# Patient Record
Sex: FEMALE | Race: WHITE | NOT HISPANIC OR LATINO
[De-identification: names, ages, dates, MRNs, and addresses within clinical notes are randomized per-mention and may not be internally consistent; named-entity substitution may affect disease eponyms.]

---

## 2020-11-02 PROBLEM — Z00.00 ENCOUNTER FOR PREVENTIVE HEALTH EXAMINATION: Status: ACTIVE | Noted: 2020-11-02

## 2020-11-17 ENCOUNTER — APPOINTMENT (OUTPATIENT)
Dept: PLASTIC SURGERY | Facility: CLINIC | Age: 50
End: 2020-11-17
Payer: COMMERCIAL

## 2020-11-17 VITALS — BODY MASS INDEX: 26.82 KG/M2 | WEIGHT: 161 LBS | HEIGHT: 65 IN

## 2020-11-17 DIAGNOSIS — Z87.898 PERSONAL HISTORY OF OTHER SPECIFIED CONDITIONS: ICD-10-CM

## 2020-11-17 DIAGNOSIS — Z98.84 BARIATRIC SURGERY STATUS: ICD-10-CM

## 2020-11-17 DIAGNOSIS — Z86.69 PERSONAL HISTORY OF OTHER DISEASES OF THE NERVOUS SYSTEM AND SENSE ORGANS: ICD-10-CM

## 2020-11-17 DIAGNOSIS — Z78.9 OTHER SPECIFIED HEALTH STATUS: ICD-10-CM

## 2020-11-17 PROCEDURE — 99072 ADDL SUPL MATRL&STAF TM PHE: CPT

## 2020-11-17 PROCEDURE — 99203 OFFICE O/P NEW LOW 30 MIN: CPT

## 2020-11-17 RX ORDER — OMEPRAZOLE 20 MG/1
20 CAPSULE, DELAYED RELEASE ORAL
Refills: 0 | Status: ACTIVE | COMMUNITY

## 2020-11-17 NOTE — PHYSICAL EXAM
[de-identified] : well-developed female, NAD [de-identified] : NC/AT [de-identified] : PERRL [de-identified] : supple [de-identified] : unlabored breathing, good inspiratory effort  [de-identified] : VALENTINAR [de-identified] : abdominal pannus with skin laxity,  [de-identified] : FROM in all 4 extremities

## 2020-11-17 NOTE — HISTORY OF PRESENT ILLNESS
[FreeTextEntry1] : 51 yo F otherwise healthy with PMHx of morbid obesity, max weight of 240 lbs who is s/p sleeve gastrectomy in 2018 at [facility] currently 161 lbs stable for at least 1 year who presents for panniculectomy consultation. Patient is c/o hanging abdominal pannus, back pain, frequent rash in skin folds treated with OTC topicals. \par \par Occupation- paraprofessional \par Nonsmoker

## 2020-11-17 NOTE — ASSESSMENT
[FreeTextEntry1] : 51 yo M with h/o MWL s/p sleeve gastrectomy who is a good candidate for panniculectomy. \par \par aug 2018 gastric sleeve w blanca-fielding at [facility]--lost 70 lbs\par current weight 161lbs\par here for abominal appearance\par nonsmoker nondiabetic\par \par has post bariatric weight loss dermal changes\par \par photos taken\par \par Due to COVID 19, pre-visit patient instructions were explained to the patient and their symptoms were checked upon arrival.  \par Masks were used by the health care providers and staff and the examination room was cleaned after the patient visit was completed.\par \par Patient is a good candidate for a panniculectomy.  Regarding the procedure, we discussed the risks of poor wound healing, seroma formation, infection, bleeding, keloid/hypertrophic scarring, dissatisfaction with the outcome, need for readmission, venous thromboembolic complications with possible pulmonary embolism.  We discussed the need for postop use of an abdominal binder and the limited activity after surgery.\par All the patient's questions were answered and all risks were well understood by the patient.\par

## 2020-12-15 ENCOUNTER — APPOINTMENT (OUTPATIENT)
Dept: OBGYN | Facility: CLINIC | Age: 50
End: 2020-12-15
Payer: COMMERCIAL

## 2020-12-15 PROCEDURE — 81002 URINALYSIS NONAUTO W/O SCOPE: CPT

## 2020-12-15 PROCEDURE — 99072 ADDL SUPL MATRL&STAF TM PHE: CPT

## 2020-12-15 PROCEDURE — 99396 PREV VISIT EST AGE 40-64: CPT

## 2021-01-05 ENCOUNTER — NON-APPOINTMENT (OUTPATIENT)
Age: 51
End: 2021-01-05

## 2021-01-05 DIAGNOSIS — Z87.42 PERSONAL HISTORY OF OTHER DISEASES OF THE FEMALE GENITAL TRACT: ICD-10-CM

## 2021-01-05 DIAGNOSIS — K21.9 GASTRO-ESOPHAGEAL REFLUX DISEASE W/OUT ESOPHAGITIS: ICD-10-CM

## 2021-01-05 RX ORDER — OMEPRAZOLE 40 MG/1
CAPSULE, DELAYED RELEASE ORAL
Refills: 0 | Status: ACTIVE | COMMUNITY

## 2021-01-19 ENCOUNTER — APPOINTMENT (OUTPATIENT)
Dept: SURGERY | Facility: CLINIC | Age: 51
End: 2021-01-19
Payer: COMMERCIAL

## 2021-01-19 VITALS
DIASTOLIC BLOOD PRESSURE: 75 MMHG | OXYGEN SATURATION: 98 % | WEIGHT: 160 LBS | TEMPERATURE: 97.4 F | HEART RATE: 79 BPM | SYSTOLIC BLOOD PRESSURE: 115 MMHG | BODY MASS INDEX: 26.66 KG/M2 | HEIGHT: 65 IN

## 2021-01-19 DIAGNOSIS — N60.42 MAMMARY DUCT ECTASIA OF LEFT BREAST: ICD-10-CM

## 2021-01-19 DIAGNOSIS — N60.41 MAMMARY DUCT ECTASIA OF RIGHT BREAST: ICD-10-CM

## 2021-01-19 PROCEDURE — 99243 OFF/OP CNSLTJ NEW/EST LOW 30: CPT

## 2021-01-19 PROCEDURE — 99072 ADDL SUPL MATRL&STAF TM PHE: CPT

## 2021-01-19 RX ORDER — HALOBETASOL PROPIONATE 0.5 MG/G
0.05 OINTMENT TOPICAL
Qty: 50 | Refills: 0 | Status: ACTIVE | COMMUNITY
Start: 2020-12-23

## 2021-01-19 RX ORDER — NAFTIFINE HYDROCHLORIDE 20 MG/G
2 CREAM TOPICAL
Qty: 45 | Refills: 0 | Status: ACTIVE | COMMUNITY
Start: 2020-12-23

## 2021-01-19 NOTE — REASON FOR VISIT
[Initial Evaluation] : an initial evaluation [FreeTextEntry1] : pt come in today with dense breast. Pt denies any family history of family cancer

## 2021-01-19 NOTE — HISTORY OF PRESENT ILLNESS
[FreeTextEntry1] : Patient presents to the office with a history of abnormal mammogram she had done in December 2020.  She recently had repeat mammography and sonogram.  She is in the office to discuss her options and like to discuss the mammographic findings.

## 2021-01-19 NOTE — CONSULT LETTER
[Dear  ___] : Dear  [unfilled], [Consult Letter:] : I had the pleasure of evaluating your patient, [unfilled]. [Please see my note below.] : Please see my note below. [Consult Closing:] : Thank you very much for allowing me to participate in the care of this patient.  If you have any questions, please do not hesitate to contact me. [Sincerely,] : Sincerely, [DrRosa  ___] : Dr. SALDAÑA [FreeTextEntry3] : Allan Luther MD, FACS\par [facility]\[place] [address]\par

## 2021-01-19 NOTE — DATA REVIEWED
[FreeTextEntry1] : Patient had mammography done on December 24, 2020 which showed bilateral asymmetries for which additional imaging was required.  On January 6, 2021 patient had mammo callback and ultrasound of the breast which failed to show any dilated ducts and the abnormality was no longer seen.  On sonogram there were diffuse bilateral retroareolar duct ectasia with no evidence of solid mass or filling defect.  Annual routine mammography was recommended.

## 2021-01-19 NOTE — ASSESSMENT
[FreeTextEntry1] : After examination patient was explained about my examination findings.  No masses were palpable.  There was no tenderness.  Patient was explained about duct ectasia in general.  The mammography and sonogram report was explained to the patient in detail.  Follow-up annual mammography was explained also.  Patient was explained that if she had any further question she could call the office.  Follow-up as needed.

## 2021-01-19 NOTE — PAST MEDICAL HISTORY
[Menarche Age ____] : age at menarche was [unfilled] [Menopause Age____] : age at menopause was [unfilled] [Approximately ___] : the LMP was approximately [unfilled] [Normal Amount/Duration] : it was of a normal amount and duration [Regular Cycle Intervals] : have been regular [Total Preg ___] : G[unfilled] [Live Births ___] : P[unfilled]  [Full Term ___] : Full Term: [unfilled] [Living ___] : Living: [unfilled] [Age At Live Birth ___] : Age at live birth: [unfilled] [History of Hormone Replacement Treatment] : has no history of hormone replacement treatment [FreeTextEntry6] : none  [FreeTextEntry5] : none [FreeTextEntry7] : none [FreeTextEntry8] : yes 32 4-5 months, 35 5-6 months , 38 6 months

## 2021-01-19 NOTE — PHYSICAL EXAM
[Normocephalic] : normocephalic [Atraumatic] : atraumatic [Supple] : supple [No Supraclavicular Adenopathy] : no supraclavicular adenopathy [Examined in the supine and seated position] : examined in the supine and seated position [Symmetrical] : symmetrical [No dominant masses] : no dominant masses in right breast  [No dominant masses] : no dominant masses left breast [No Nipple Retraction] : no left nipple retraction [No Nipple Discharge] : no left nipple discharge [Breast Mass Right Breast ___cm] : no masses [Breast Mass Left Breast ___cm] : no masses [Breast Nipple Inversion] : nipples not inverted [Breast Nipple Retraction] : nipples not retracted [Breast Nipple Flattening] : nipples not flattened [Breast Nipple Fissures] : nipples not fissured [Breast Abnormal Lactation (Galactorrhea)] : no galactorrhea [Breast Abnormal Secretion Bloody Fluid] : no bloody discharge [Breast Abnormal Secretion Serous Fluid] : no serous discharge [Breast Abnormal Secretion Opalescent Fluid] : no milky discharge [No Axillary Lymphadenopathy] : no left axillary lymphadenopathy [No Rashes] : no rashes [No Ulceration] : no ulceration

## 2021-03-17 ENCOUNTER — OUTPATIENT (OUTPATIENT)
Dept: OUTPATIENT SERVICES | Facility: HOSPITAL | Age: 51
LOS: 1 days | Discharge: HOME | End: 2021-03-17
Payer: COMMERCIAL

## 2021-03-17 VITALS
TEMPERATURE: 98 F | WEIGHT: 163.14 LBS | OXYGEN SATURATION: 98 % | RESPIRATION RATE: 18 BRPM | DIASTOLIC BLOOD PRESSURE: 69 MMHG | SYSTOLIC BLOOD PRESSURE: 123 MMHG | HEART RATE: 89 BPM

## 2021-03-17 DIAGNOSIS — E66.09 OTHER OBESITY DUE TO EXCESS CALORIES: ICD-10-CM

## 2021-03-17 DIAGNOSIS — Z98.890 OTHER SPECIFIED POSTPROCEDURAL STATES: Chronic | ICD-10-CM

## 2021-03-17 DIAGNOSIS — Z01.818 ENCOUNTER FOR OTHER PREPROCEDURAL EXAMINATION: ICD-10-CM

## 2021-03-17 LAB
ALBUMIN SERPL ELPH-MCNC: 4.5 G/DL — SIGNIFICANT CHANGE UP (ref 3.5–5.2)
ALP SERPL-CCNC: 54 U/L — SIGNIFICANT CHANGE UP (ref 30–115)
ALT FLD-CCNC: 11 U/L — SIGNIFICANT CHANGE UP (ref 0–41)
ANION GAP SERPL CALC-SCNC: 11 MMOL/L — SIGNIFICANT CHANGE UP (ref 7–14)
APTT BLD: 30.7 SEC — SIGNIFICANT CHANGE UP (ref 27–39.2)
AST SERPL-CCNC: 16 U/L — SIGNIFICANT CHANGE UP (ref 0–41)
BASOPHILS # BLD AUTO: 0.05 K/UL — SIGNIFICANT CHANGE UP (ref 0–0.2)
BASOPHILS NFR BLD AUTO: 1.1 % — HIGH (ref 0–1)
BILIRUB SERPL-MCNC: 0.4 MG/DL — SIGNIFICANT CHANGE UP (ref 0.2–1.2)
BUN SERPL-MCNC: 14 MG/DL — SIGNIFICANT CHANGE UP (ref 10–20)
CALCIUM SERPL-MCNC: 9.3 MG/DL — SIGNIFICANT CHANGE UP (ref 8.5–10.1)
CHLORIDE SERPL-SCNC: 103 MMOL/L — SIGNIFICANT CHANGE UP (ref 98–110)
CO2 SERPL-SCNC: 26 MMOL/L — SIGNIFICANT CHANGE UP (ref 17–32)
CREAT SERPL-MCNC: 0.6 MG/DL — LOW (ref 0.7–1.5)
EOSINOPHIL # BLD AUTO: 0.2 K/UL — SIGNIFICANT CHANGE UP (ref 0–0.7)
EOSINOPHIL NFR BLD AUTO: 4.2 % — SIGNIFICANT CHANGE UP (ref 0–8)
GLUCOSE SERPL-MCNC: 73 MG/DL — SIGNIFICANT CHANGE UP (ref 70–99)
HCT VFR BLD CALC: 41.1 % — SIGNIFICANT CHANGE UP (ref 37–47)
HGB BLD-MCNC: 13.2 G/DL — SIGNIFICANT CHANGE UP (ref 12–16)
IMM GRANULOCYTES NFR BLD AUTO: 0.2 % — SIGNIFICANT CHANGE UP (ref 0.1–0.3)
INR BLD: 0.95 RATIO — SIGNIFICANT CHANGE UP (ref 0.65–1.3)
LYMPHOCYTES # BLD AUTO: 1.4 K/UL — SIGNIFICANT CHANGE UP (ref 1.2–3.4)
LYMPHOCYTES # BLD AUTO: 29.6 % — SIGNIFICANT CHANGE UP (ref 20.5–51.1)
MCHC RBC-ENTMCNC: 29.9 PG — SIGNIFICANT CHANGE UP (ref 27–31)
MCHC RBC-ENTMCNC: 32.1 G/DL — SIGNIFICANT CHANGE UP (ref 32–37)
MCV RBC AUTO: 93 FL — SIGNIFICANT CHANGE UP (ref 81–99)
MONOCYTES # BLD AUTO: 0.6 K/UL — SIGNIFICANT CHANGE UP (ref 0.1–0.6)
MONOCYTES NFR BLD AUTO: 12.7 % — HIGH (ref 1.7–9.3)
NEUTROPHILS # BLD AUTO: 2.47 K/UL — SIGNIFICANT CHANGE UP (ref 1.4–6.5)
NEUTROPHILS NFR BLD AUTO: 52.2 % — SIGNIFICANT CHANGE UP (ref 42.2–75.2)
NRBC # BLD: 0 /100 WBCS — SIGNIFICANT CHANGE UP (ref 0–0)
PLATELET # BLD AUTO: 216 K/UL — SIGNIFICANT CHANGE UP (ref 130–400)
POTASSIUM SERPL-MCNC: 4.4 MMOL/L — SIGNIFICANT CHANGE UP (ref 3.5–5)
POTASSIUM SERPL-SCNC: 4.4 MMOL/L — SIGNIFICANT CHANGE UP (ref 3.5–5)
PROT SERPL-MCNC: 6.9 G/DL — SIGNIFICANT CHANGE UP (ref 6–8)
PROTHROM AB SERPL-ACNC: 10.9 SEC — SIGNIFICANT CHANGE UP (ref 9.95–12.87)
RBC # BLD: 4.42 M/UL — SIGNIFICANT CHANGE UP (ref 4.2–5.4)
RBC # FLD: 12 % — SIGNIFICANT CHANGE UP (ref 11.5–14.5)
SODIUM SERPL-SCNC: 140 MMOL/L — SIGNIFICANT CHANGE UP (ref 135–146)
WBC # BLD: 4.73 K/UL — LOW (ref 4.8–10.8)
WBC # FLD AUTO: 4.73 K/UL — LOW (ref 4.8–10.8)

## 2021-03-17 PROCEDURE — 93010 ELECTROCARDIOGRAM REPORT: CPT

## 2021-03-17 NOTE — H&P PST ADULT - NSICDXPASTMEDICALHX_GEN_ALL_CORE_FT
PAST MEDICAL HISTORY:  GERD (gastroesophageal reflux disease)     Obesity, morbid s/p gastric sleeve

## 2021-03-17 NOTE — H&P PST ADULT - HISTORY OF PRESENT ILLNESS
The patient is a 51 year old female with a PMH of morbid obesity, s/p gastric sleeve surgery in 08/2018. The patient is a now proceeding for a panniculectomy with Dr. Acosta. Today, the patient presents to [facility] for preop evaluation in preparation for scheduled surgery/procedure. The patient denies chest pains, SOB, palpitations, cough or dysuria. Able to walk 1-2 FOS without ARBOLEDA, CP or palpitations.    Anesthesia Alert  NO--Difficult Airway  NO--History of neck surgery or radiation  NO--Limited ROM of neck  NO--History of Malignant hyperthermia  NO--No personal or family history of Pseudocholinesterase deficiency.  NO--Prior Anesthesia Complication  NO--Latex Allergy  NO--Loose teeth  NO--History of Rheumatoid Arthritis  NO--YESICA  NO--Other_____

## 2021-03-29 DIAGNOSIS — G89.18 OTHER ACUTE POSTPROCEDURAL PAIN: ICD-10-CM

## 2021-03-29 RX ORDER — GABAPENTIN 300 MG/1
300 CAPSULE ORAL
Qty: 6 | Refills: 0 | Status: ACTIVE | COMMUNITY
Start: 2021-03-29 | End: 1900-01-01

## 2021-03-29 RX ORDER — CELECOXIB 400 MG/1
400 CAPSULE ORAL
Qty: 6 | Refills: 0 | Status: ACTIVE | COMMUNITY
Start: 2021-03-29 | End: 1900-01-01

## 2021-04-04 ENCOUNTER — LABORATORY RESULT (OUTPATIENT)
Age: 51
End: 2021-04-04

## 2021-04-04 ENCOUNTER — OUTPATIENT (OUTPATIENT)
Dept: OUTPATIENT SERVICES | Facility: HOSPITAL | Age: 51
LOS: 1 days | Discharge: HOME | End: 2021-04-04

## 2021-04-04 DIAGNOSIS — Z98.890 OTHER SPECIFIED POSTPROCEDURAL STATES: Chronic | ICD-10-CM

## 2021-04-04 DIAGNOSIS — Z11.59 ENCOUNTER FOR SCREENING FOR OTHER VIRAL DISEASES: ICD-10-CM

## 2021-04-04 PROBLEM — E66.01 MORBID (SEVERE) OBESITY DUE TO EXCESS CALORIES: Chronic | Status: ACTIVE | Noted: 2021-03-17

## 2021-04-04 PROBLEM — K21.9 GASTRO-ESOPHAGEAL REFLUX DISEASE WITHOUT ESOPHAGITIS: Chronic | Status: ACTIVE | Noted: 2021-03-17

## 2021-04-07 ENCOUNTER — APPOINTMENT (OUTPATIENT)
Dept: PLASTIC SURGERY | Facility: AMBULATORY SURGERY CENTER | Age: 51
End: 2021-04-07
Payer: COMMERCIAL

## 2021-04-07 ENCOUNTER — RESULT REVIEW (OUTPATIENT)
Age: 51
End: 2021-04-07

## 2021-04-07 ENCOUNTER — OUTPATIENT (OUTPATIENT)
Dept: OUTPATIENT SERVICES | Facility: HOSPITAL | Age: 51
LOS: 1 days | Discharge: HOME | End: 2021-04-07
Payer: COMMERCIAL

## 2021-04-07 VITALS
SYSTOLIC BLOOD PRESSURE: 106 MMHG | OXYGEN SATURATION: 100 % | HEIGHT: 65.5 IN | RESPIRATION RATE: 17 BRPM | DIASTOLIC BLOOD PRESSURE: 72 MMHG | WEIGHT: 162.04 LBS | TEMPERATURE: 98 F | HEART RATE: 66 BPM

## 2021-04-07 VITALS — RESPIRATION RATE: 18 BRPM | HEART RATE: 67 BPM | OXYGEN SATURATION: 97 %

## 2021-04-07 DIAGNOSIS — Z98.890 OTHER SPECIFIED POSTPROCEDURAL STATES: Chronic | ICD-10-CM

## 2021-04-07 PROCEDURE — 15830 EXC EXCESSIVE SKIN ABDOMEN: CPT

## 2021-04-07 PROCEDURE — 88302 TISSUE EXAM BY PATHOLOGIST: CPT | Mod: 26

## 2021-04-07 RX ORDER — SODIUM CHLORIDE 9 MG/ML
1000 INJECTION, SOLUTION INTRAVENOUS
Refills: 0 | Status: DISCONTINUED | OUTPATIENT
Start: 2021-04-07 | End: 2021-04-21

## 2021-04-07 RX ORDER — OMEPRAZOLE 10 MG/1
1 CAPSULE, DELAYED RELEASE ORAL
Qty: 0 | Refills: 0 | DISCHARGE

## 2021-04-07 RX ORDER — OXYCODONE AND ACETAMINOPHEN 5; 325 MG/1; MG/1
1 TABLET ORAL EVERY 4 HOURS
Refills: 0 | Status: DISCONTINUED | OUTPATIENT
Start: 2021-04-07 | End: 2021-04-07

## 2021-04-07 RX ORDER — HYDROMORPHONE HYDROCHLORIDE 2 MG/ML
0.5 INJECTION INTRAMUSCULAR; INTRAVENOUS; SUBCUTANEOUS
Refills: 0 | Status: DISCONTINUED | OUTPATIENT
Start: 2021-04-07 | End: 2021-04-07

## 2021-04-07 RX ORDER — TRAMADOL HYDROCHLORIDE 50 MG/1
1 TABLET ORAL
Qty: 10 | Refills: 0
Start: 2021-04-07

## 2021-04-07 RX ORDER — CEPHALEXIN 500 MG
1 CAPSULE ORAL
Qty: 20 | Refills: 0
Start: 2021-04-07 | End: 2021-04-11

## 2021-04-07 RX ADMIN — HYDROMORPHONE HYDROCHLORIDE 0.5 MILLIGRAM(S): 2 INJECTION INTRAMUSCULAR; INTRAVENOUS; SUBCUTANEOUS at 11:40

## 2021-04-07 NOTE — ASU PREOP CHECKLIST - PATIENT'S PERSONAL PROPERTY REMOVED
Phone placed in Locker with all other belongings prior to patient going to OR Phone placed in Locker E with all other belongings prior to patient going to OR.

## 2021-04-07 NOTE — ASU DISCHARGE PLAN (ADULT/PEDIATRIC) - ASU DC SPECIAL INSTRUCTIONSFT
Diet: You may resume your usual diet. Avoid alcohol and excessive salt intake for two weeks following surgery. This will help to minimize swelling.    Medications: Continue Gabapentin and Celebrex twice daily until complete. Take Tylenol 650mg every 6 hours. If pain is still not well controlled, then also take Tramadol as needed. Call the office with any issues. Take all antibiotics as prescribed. Remember no NSAIDS (Aspirin, Advil, Aleve, Motrin) as they can increase bruising.    Activity: Start walking as soon as possible to increase circulation and prevent blood clots. You may take care of your personal needs as desired, however, no lifting or strenuous activity is allowed for 4 weeks following surgery. Driving is not permitted for at least two weeks. Avoid standing up completely straight. Rather, walk slightly bent over to avoid pulling at abdominal closure. When laying down, use pillows under your head and knees to create a "beach chair" position.    Wound care: Keep your dressing clean, dry, and intact until seen by MD/PA. Wear the abdominal binder which will be provided to you at all times. Do not smoke! It delays wound healing and increases the risk of complications.    Personal Hygiene: Do not get the operative area wet. You are allowed to sponge bathe. Do not remove the abdominal binder. No tub soaking.    Drains: Please monitor and record the drain output daily. Keep an organized list of the output from each drain, in cc or mL, not ounces, and bring this with you to your postoperative visit.    Sun Exposure: You may be in the sun one month following surgery. Avoid sunburn. Always use a sunscreen of at least SPF30.    Things to expect: The operative area may be bruised, swollen, and painful. You may also have temporary numbness which will improve over time.    In case of emergency: call the office any time day or night. Post-operative care will be provided in the office one week following surgery. If you do not already have an appointment, please call during regular office hours to schedule: [phone].     We wish you a pleasant recovery.

## 2021-04-09 LAB — SURGICAL PATHOLOGY STUDY: SIGNIFICANT CHANGE UP

## 2021-04-14 ENCOUNTER — APPOINTMENT (OUTPATIENT)
Dept: PLASTIC SURGERY | Facility: CLINIC | Age: 51
End: 2021-04-14
Payer: COMMERCIAL

## 2021-04-14 PROCEDURE — 99024 POSTOP FOLLOW-UP VISIT: CPT

## 2021-04-14 NOTE — ASSESSMENT
[FreeTextEntry1] : 49 yo M with h/o MWL s/p sleeve gastrectomy, now POD #7 s/p panniculectomy, doing well\par \par -Continue drain monitoring\par -Bandages changed\par -Continue abdominal binder, no heavy lifting\par -Tylenol PRN\par -F/u 1 week\par \par Due to COVID-19, pre-visit patient instructions were explained to the patient and their symptoms were checked upon arrival. Masks were used by the healthcare provider and staff and the examination room was cleaned after the patient visit concluded\par

## 2021-04-14 NOTE — HISTORY OF PRESENT ILLNESS
[FreeTextEntry1] : 49 yo F otherwise healthy with PMHx of morbid obesity, max weight of 240 lbs who is s/p sleeve gastrectomy in 2018 at [facility] currently 161 lbs stable for at least 1 year who presents for panniculectomy consultation. Patient is c/o hanging abdominal pannus, back pain, frequent rash in skin folds treated with OTC topicals. \par \par Occupation- paraprofessional \par Nonsmoker \par \par Interval hx (4/14/21): Pt presents today POD #7 s/p panniculectomy. C/o discomfort from left drain site but otherwise feeling well. Denies f/c, CP/SOB, calf pain, or significant incisional pain. Drain output : left 70/40/60, right 100/100/60.

## 2021-04-14 NOTE — PHYSICAL EXAM
[de-identified] : well-developed female, NAD [de-identified] : NC/AT [de-identified] : PERRL [de-identified] : supple [de-identified] : unlabored breathing, good inspiratory effort  [de-identified] : VALENTINAR [de-identified] : abdominal and umbilical incisions healing well, nontender throughout, diffuse resolving ecchymoses, BL drains in place and functional with SS output, no palpable fluid collection [de-identified] : FROM in all 4 extremities

## 2021-04-16 DIAGNOSIS — M79.3 PANNICULITIS, UNSPECIFIED: ICD-10-CM

## 2021-04-16 DIAGNOSIS — Z98.84 BARIATRIC SURGERY STATUS: ICD-10-CM

## 2021-04-16 DIAGNOSIS — K21.9 GASTRO-ESOPHAGEAL REFLUX DISEASE WITHOUT ESOPHAGITIS: ICD-10-CM

## 2021-04-21 ENCOUNTER — APPOINTMENT (OUTPATIENT)
Dept: PLASTIC SURGERY | Facility: CLINIC | Age: 51
End: 2021-04-21
Payer: COMMERCIAL

## 2021-04-21 PROCEDURE — 99024 POSTOP FOLLOW-UP VISIT: CPT

## 2021-04-21 NOTE — DATA REVIEWED
[FreeTextEntry1] : Pathology             Final\par \par No Documents Attached\par \par \par \par   Kenan Accession Number : [id number]\par \par NYDIA SON\par \par \par \par Surgical Final Report\par \par \par \par \par Final Diagnosis\par Abdominal pannus (767 gms):\par - Skin with underlying adipose tissue showing fibrosis consistent\par with scar formation.\par - No active panniculitis is seen in the examined material.\par \par Verified by: Beto Dueñas MD\par (Electronic Signature)\par Reported on: 04/09/21 12:54 EDT, [address], [place],[facility] [place] [address]\par Phone: [phone]   Fax: [phone]\par _________________________________________________________________\par \par Clinical History\par Panniculectomy\par \par Specimen(s) Submitted\par Abdominal pannus\par \par Gross Description\par The specimen is received in formalin, labeled "abdominal pannus"\par and consists of two large pie shaped fragments of skin and\par subcutaneous tissue connected by thin rim of skin together\par measuring 24 x 21 x 3 cm and weighs 767 gm.  On the surface of\par the skin there is multiple focal areas of opening/defect that\par cannot be probed. The two areas of scar, one scar measures 2 cm\par in length and 0.3 cm in diameter and the other scar measures 3 cm\par in length and 0.3 cm in diameter.  On sectioning, cut surface is\par soft yellow lobulated with fibrotic bands in between and focal\par areas of hemorrhage. Representative sections are submitted.\par \par Summary of Sections:\par 1A-1C - random sections of skin with subcutaneous tissue -3\par 1D - section of scar -1\par 1E - section of subcutaneous tissue -1\par \par Total:  5 blocks\par \par Specimen was received and underwent gross examination at [facility], [address] [place] [address],\par [place] [address].\par \par 04/07/2021 15:32:33 EDT mk\par \par Perioperative Diagnosis\par Panniculitis\par \par  \par \par  Ordered by: HARINDER DE LA ROSA IV       Collected/Examined: 07Apr2021 09:06AM       \par Verified by: BUNNY HEBERT 14Apr2021 10:43AM       \par  Result Communication: Discussed results with patient;\par Stage: Final       \par  Performed at: [facility]       Resulted: 09Apr2021 12:54PM       Last Updated: 14Apr2021 10:43AM       Accession: [id number]

## 2021-04-21 NOTE — PHYSICAL EXAM
[de-identified] : well-developed female, NAD [de-identified] : abdominal and umbilical incisions healing well, nontender throughout, diffuse resolving ecchymoses, left abdominal skin blisters with tenderness to palpation, no superimposed infection, BL drains in place and functional with SS output, no palpable fluid collection [de-identified] : unlabored breathing, good inspiratory effort

## 2021-04-21 NOTE — ASSESSMENT
[FreeTextEntry1] : 51 yo M with h/o MWL s/p sleeve gastrectomy, now POD #14 s/p panniculectomy, doing well.\par \par -left drain removed\par -Bandages changed\par -Bacitracin to blisters\par -Continue abdominal binder, no heavy lifting\par -Post-op instruction reviewed and all questions answered \par -F/u next week for drain removal. \par \par Due to COVID-19, pre-visit patient instructions were explained to the patient and their symptoms were checked upon arrival. Masks were used by the healthcare provider and staff and the examination room was cleaned after the patient visit concluded\par

## 2021-04-21 NOTE — HISTORY OF PRESENT ILLNESS
[FreeTextEntry1] : 51 yo F otherwise healthy with PMHx of morbid obesity, max weight of 240 lbs who is s/p sleeve gastrectomy in 2018 at [facility] currently 161 lbs stable for at least 1 year who presents for panniculectomy consultation. Patient is c/o hanging abdominal pannus, back pain, frequent rash in skin folds treated with OTC topicals. \par \par Occupation- paraprofessional \par Nonsmoker \par \par Interval hx (4/14/21): Pt presents today POD #7 s/p panniculectomy. C/o discomfort from left drain site but otherwise feeling well. Denies f/c, CP/SOB, calf pain, or significant incisional pain. Drain output : left 70/40/60, right 100/100/60.\par \par Interval hx (4/14/21): Pt presents today POD #14 s/p panniculectomy. Doing well c/o left abdominal discomfort around drain site. Denies any f/c or bleeding. Compliant with abdominal binder, ambulating. Drains functional Rt 60/50/40, Lt 20/10/15

## 2021-04-26 ENCOUNTER — APPOINTMENT (OUTPATIENT)
Dept: PLASTIC SURGERY | Facility: CLINIC | Age: 51
End: 2021-04-26
Payer: COMMERCIAL

## 2021-04-26 PROCEDURE — 99024 POSTOP FOLLOW-UP VISIT: CPT

## 2021-04-26 NOTE — HISTORY OF PRESENT ILLNESS
[FreeTextEntry1] : 49 yo F otherwise healthy with PMHx of morbid obesity, max weight of 240 lbs who is s/p sleeve gastrectomy in 2018 at [facility] currently 161 lbs stable for at least 1 year who presents for panniculectomy consultation. Patient is c/o hanging abdominal pannus, back pain, frequent rash in skin folds treated with OTC topicals. \par \par Occupation- paraprofessional \par Nonsmoker \par \par Interval hx (4/14/21): Pt presents today POD #7 s/p panniculectomy. C/o discomfort from left drain site but otherwise feeling well. Denies f/c, CP/SOB, calf pain, or significant incisional pain. Drain output : left 70/40/60, right 100/100/60.\par \par Interval hx (4/21/21): Pt presents today POD #14 s/p panniculectomy. Doing well c/o left abdominal discomfort around drain site. Denies any f/c or bleeding. Compliant with abdominal binder, ambulating. Drains functional Rt 60/50/40, Lt 20/10/15\par \par Interval hx (4/26/21): Pt presents today POD #26 s/p panniculectomy. Doing well, c/o pruritus but otherwise offers no complaints. Right drain: 42/50/53

## 2021-04-26 NOTE — DATA REVIEWED
[FreeTextEntry1] : Pathology             Final\par \par No Documents Attached\par \par \par \par   Kenan Accession Number : [id number]\par \par NYDIA SON\par \par \par \par Surgical Final Report\par \par \par \par \par Final Diagnosis\par Abdominal pannus (767 gms):\par - Skin with underlying adipose tissue showing fibrosis consistent\par with scar formation.\par - No active panniculitis is seen in the examined material.\par \par Verified by: Beto Dueñas MD\par (Electronic Signature)\par Reported on: 04/09/21 12:54 EDT, [address], [place],[facility] [place] [address]\par Phone: [phone]   Fax: [phone]\par _________________________________________________________________\par \par Clinical History\par Panniculectomy\par \par Specimen(s) Submitted\par Abdominal pannus\par \par Gross Description\par The specimen is received in formalin, labeled "abdominal pannus"\par and consists of two large pie shaped fragments of skin and\par subcutaneous tissue connected by thin rim of skin together\par measuring 24 x 21 x 3 cm and weighs 767 gm.  On the surface of\par the skin there is multiple focal areas of opening/defect that\par cannot be probed. The two areas of scar, one scar measures 2 cm\par in length and 0.3 cm in diameter and the other scar measures 3 cm\par in length and 0.3 cm in diameter.  On sectioning, cut surface is\par soft yellow lobulated with fibrotic bands in between and focal\par areas of hemorrhage. Representative sections are submitted.\par \par Summary of Sections:\par 1A-1C - random sections of skin with subcutaneous tissue -3\par 1D - section of scar -1\par 1E - section of subcutaneous tissue -1\par \par Total:  5 blocks\par \par Specimen was received and underwent gross examination at [facility], [address] [place] [address],\par [place] [address].\par \par 04/07/2021 15:32:33 EDT mk\par \par Perioperative Diagnosis\par Panniculitis\par \par  \par \par  Ordered by: HARINDER DE LA ROSA IV       Collected/Examined: 07Apr2021 09:06AM       \par Verified by: BUNNY HEBERT 14Apr2021 10:43AM       \par  Result Communication: Discussed results with patient;\par Stage: Final       \par  Performed at: [facility]       Resulted: 09Apr2021 12:54PM       Last Updated: 14Apr2021 10:43AM       Accession: [id number]

## 2021-04-26 NOTE — PHYSICAL EXAM
[de-identified] : unlabored breathing, good inspiratory effort  [de-identified] : well-developed female, NAD [de-identified] : abdominal and umbilical incisions healing well, nontender throughout, swelling as expected, no erythema, right drain in place and functional with dark SS output, no palpable fluid collection

## 2021-04-30 ENCOUNTER — APPOINTMENT (OUTPATIENT)
Dept: PLASTIC SURGERY | Facility: CLINIC | Age: 51
End: 2021-04-30
Payer: COMMERCIAL

## 2021-04-30 PROCEDURE — 99024 POSTOP FOLLOW-UP VISIT: CPT

## 2021-04-30 NOTE — HISTORY OF PRESENT ILLNESS
[FreeTextEntry1] : 51 yo F otherwise healthy with PMHx of morbid obesity, max weight of 240 lbs who is s/p sleeve gastrectomy in 2018 at [facility] currently 161 lbs stable for at least 1 year who presents for panniculectomy consultation. Patient is c/o hanging abdominal pannus, back pain, frequent rash in skin folds treated with OTC topicals. \par \par Occupation- paraprofessional \par Nonsmoker \par \par Interval hx (4/14/21): Pt presents today POD #7 s/p panniculectomy. C/o discomfort from left drain site but otherwise feeling well. Denies f/c, CP/SOB, calf pain, or significant incisional pain. Drain output : left 70/40/60, right 100/100/60.\par \par Interval hx (4/21/21): Pt presents today POD #14 s/p panniculectomy. Doing well c/o left abdominal discomfort around drain site. Denies any f/c or bleeding. Compliant with abdominal binder, ambulating. Drains functional Rt 60/50/40, Lt 20/10/15\par \par Interval hx (4/26/21): Pt presents today POD #19 s/p panniculectomy. Doing well, c/o pruritus but otherwise offers no complaints. Right drain: 42/50/53\par \par Interval hx (4/30/21): Pt presents today POD #23 s/p panniculectomy. Drain output 40/60/40

## 2021-04-30 NOTE — PHYSICAL EXAM
[de-identified] : well-developed female, NAD [de-identified] : unlabored breathing, good inspiratory effort  [de-identified] : abdominal and umbilical incisions healing well, nontender throughout, swelling as expected, no erythema, right drain in place and functional with dark SS output, no palpable fluid collection

## 2021-04-30 NOTE — DATA REVIEWED
[FreeTextEntry1] : Pathology             Final\par \par No Documents Attached\par \par \par \par   Kenan Accession Number : [id number]\par \par NYDIA SON\par \par \par \par Surgical Final Report\par \par \par \par \par Final Diagnosis\par Abdominal pannus (767 gms):\par - Skin with underlying adipose tissue showing fibrosis consistent\par with scar formation.\par - No active panniculitis is seen in the examined material.\par \par Verified by: Beto Dueñas MD\par (Electronic Signature)\par Reported on: 04/09/21 12:54 EDT, [address], [place],[facility] [place] [address]\par Phone: [phone]   Fax: [phone]\par _________________________________________________________________\par \par Clinical History\par Panniculectomy\par \par Specimen(s) Submitted\par Abdominal pannus\par \par Gross Description\par The specimen is received in formalin, labeled "abdominal pannus"\par and consists of two large pie shaped fragments of skin and\par subcutaneous tissue connected by thin rim of skin together\par measuring 24 x 21 x 3 cm and weighs 767 gm.  On the surface of\par the skin there is multiple focal areas of opening/defect that\par cannot be probed. The two areas of scar, one scar measures 2 cm\par in length and 0.3 cm in diameter and the other scar measures 3 cm\par in length and 0.3 cm in diameter.  On sectioning, cut surface is\par soft yellow lobulated with fibrotic bands in between and focal\par areas of hemorrhage. Representative sections are submitted.\par \par Summary of Sections:\par 1A-1C - random sections of skin with subcutaneous tissue -3\par 1D - section of scar -1\par 1E - section of subcutaneous tissue -1\par \par Total:  5 blocks\par \par Specimen was received and underwent gross examination at [facility], [address] [place] [address],\par [place] [address].\par \par 04/07/2021 15:32:33 EDT mk\par \par Perioperative Diagnosis\par Panniculitis\par \par  \par \par  Ordered by: HARINDER DE LA ROSA IV       Collected/Examined: 07Apr2021 09:06AM       \par Verified by: BUNNY HEBERT 14Apr2021 10:43AM       \par  Result Communication: Discussed results with patient;\par Stage: Final       \par  Performed at: [facility]       Resulted: 09Apr2021 12:54PM       Last Updated: 14Apr2021 10:43AM       Accession: [id number]

## 2021-04-30 NOTE — ASSESSMENT
[FreeTextEntry1] : 51 yo M with h/o MWL s/p sleeve gastrectomy, now POD #23 s/p panniculectomy, doing well.\par \par -drain removed\par -daily aquaphor\par -Continue abdominal binder, no heavy lifting\par -Post-op instruction reviewed and all questions answered \par -F/u 3 weeks for activity clearance; may change to 2.5 months if feeling well\par \par Due to COVID-19, pre-visit patient instructions were explained to the patient and their symptoms were checked upon arrival. Masks were used by the healthcare provider and staff and the examination room was cleaned after the patient visit concluded\par

## 2021-05-18 ENCOUNTER — APPOINTMENT (OUTPATIENT)
Dept: PLASTIC SURGERY | Facility: CLINIC | Age: 51
End: 2021-05-18
Payer: COMMERCIAL

## 2021-05-18 PROCEDURE — 99024 POSTOP FOLLOW-UP VISIT: CPT

## 2021-05-18 NOTE — ASSESSMENT
[FreeTextEntry1] : 51 yo M with h/o MWL s/p sleeve gastrectomy, now POD #23 s/p panniculectomy, doing well.\par \par -drain removed\par -daily aquaphor\par -Continue abdominal binder, no heavy lifting\par -Post-op instruction reviewed and all questions answered \par -F/u 3 weeks for activity clearance; may change to 2.5 months if feeling well\par \par Due to COVID-19, pre-visit patient instructions were explained to the patient and their symptoms were checked upon arrival. Masks were used by the healthcare provider and staff and the examination room was cleaned after the patient visit concluded\par \par as above\par 6 wks postop\par doing well\par stop abd binder (was wearing too tight resulting in assymteric resolution of swelling\par \par may jetski\par some minor dimpling right lateral aspect of incision--reassurance given too early to tell if will stay this way.  if so  it is a simple office procedure to fix\par \par f/u 6 months\par \par pt happy thusfar\par \par Due to COVID 19, pre-visit patient instructions were explained to the patient and their symptoms were checked upon arrival.  \par Masks were used by the health care providers and staff and the examination room was cleaned after the patient visit was completed.\par \par

## 2021-05-18 NOTE — DATA REVIEWED
[FreeTextEntry1] : Pathology             Final\par \par No Documents Attached\par \par \par \par   Kenan Accession Number : [id number]\par \par NYDIA SON\par \par \par \par Surgical Final Report\par \par \par \par \par Final Diagnosis\par Abdominal pannus (767 gms):\par - Skin with underlying adipose tissue showing fibrosis consistent\par with scar formation.\par - No active panniculitis is seen in the examined material.\par \par Verified by: Beto Dueñas MD\par (Electronic Signature)\par Reported on: 04/09/21 12:54 EDT, [address], [place],[facility] [place] [address]\par Phone: [phone]   Fax: [phone]\par _________________________________________________________________\par \par Clinical History\par Panniculectomy\par \par Specimen(s) Submitted\par Abdominal pannus\par \par Gross Description\par The specimen is received in formalin, labeled "abdominal pannus"\par and consists of two large pie shaped fragments of skin and\par subcutaneous tissue connected by thin rim of skin together\par measuring 24 x 21 x 3 cm and weighs 767 gm.  On the surface of\par the skin there is multiple focal areas of opening/defect that\par cannot be probed. The two areas of scar, one scar measures 2 cm\par in length and 0.3 cm in diameter and the other scar measures 3 cm\par in length and 0.3 cm in diameter.  On sectioning, cut surface is\par soft yellow lobulated with fibrotic bands in between and focal\par areas of hemorrhage. Representative sections are submitted.\par \par Summary of Sections:\par 1A-1C - random sections of skin with subcutaneous tissue -3\par 1D - section of scar -1\par 1E - section of subcutaneous tissue -1\par \par Total:  5 blocks\par \par Specimen was received and underwent gross examination at [facility], [address] [place] [address],\par [place] [address].\par \par 04/07/2021 15:32:33 EDT mk\par \par Perioperative Diagnosis\par Panniculitis\par \par  \par \par  Ordered by: HARINDER DE LA ROSA IV       Collected/Examined: 07Apr2021 09:06AM       \par Verified by: BUNNY HEBERT 14Apr2021 10:43AM       \par  Result Communication: Discussed results with patient;\par Stage: Final       \par  Performed at: [facility]       Resulted: 09Apr2021 12:54PM       Last Updated: 14Apr2021 10:43AM       Accession: [id number]

## 2021-05-18 NOTE — PHYSICAL EXAM
[de-identified] : well-developed female, NAD [de-identified] : unlabored breathing, good inspiratory effort  [de-identified] : abdominal and umbilical incisions healing well, nontender throughout, swelling as expected, no erythema, right drain in place and functional with dark SS output, no palpable fluid collection

## 2021-05-18 NOTE — HISTORY OF PRESENT ILLNESS
[FreeTextEntry1] : 51 yo F otherwise healthy with PMHx of morbid obesity, max weight of 240 lbs who is s/p sleeve gastrectomy in 2018 at [facility] currently 161 lbs stable for at least 1 year who presents for panniculectomy consultation. Patient is c/o hanging abdominal pannus, back pain, frequent rash in skin folds treated with OTC topicals. \par \par Occupation- paraprofessional \par Nonsmoker \par \par Interval hx (4/14/21): Pt presents today POD #7 s/p panniculectomy. C/o discomfort from left drain site but otherwise feeling well. Denies f/c, CP/SOB, calf pain, or significant incisional pain. Drain output : left 70/40/60, right 100/100/60.\par \par Interval hx (4/21/21): Pt presents today POD #14 s/p panniculectomy. Doing well c/o left abdominal discomfort around drain site. Denies any f/c or bleeding. Compliant with abdominal binder, ambulating. Drains functional Rt 60/50/40, Lt 20/10/15\par \par Interval hx (4/26/21): Pt presents today POD #19 s/p panniculectomy. Doing well, c/o pruritus but otherwise offers no complaints. Right drain: 42/50/53\par \par Interval hx (4/30/21): Pt presents today POD #23 s/p panniculectomy. Drain output 40/60/40

## 2021-10-21 ENCOUNTER — APPOINTMENT (OUTPATIENT)
Dept: PLASTIC SURGERY | Facility: CLINIC | Age: 51
End: 2021-10-21
Payer: COMMERCIAL

## 2021-10-21 PROCEDURE — 99212 OFFICE O/P EST SF 10 MIN: CPT

## 2021-10-21 NOTE — HISTORY OF PRESENT ILLNESS
[FreeTextEntry1] : 49 yo F otherwise healthy with PMHx of morbid obesity, max weight of 240 lbs who is s/p sleeve gastrectomy in 2018 at [facility] currently 161 lbs stable for at least 1 year who presents for panniculectomy consultation. Patient is c/o hanging abdominal pannus, back pain, frequent rash in skin folds treated with OTC topicals. \par \par Occupation- paraprofessional \par Nonsmoker \par \par Interval hx (4/14/21): Pt presents today POD #7 s/p panniculectomy. C/o discomfort from left drain site but otherwise feeling well. Denies f/c, CP/SOB, calf pain, or significant incisional pain. Drain output : left 70/40/60, right 100/100/60.\par \par Interval hx (4/21/21): Pt presents today POD #14 s/p panniculectomy. Doing well c/o left abdominal discomfort around drain site. Denies any f/c or bleeding. Compliant with abdominal binder, ambulating. Drains functional Rt 60/50/40, Lt 20/10/15\par \par Interval hx (4/26/21): Pt presents today POD #19 s/p panniculectomy. Doing well, c/o pruritus but otherwise offers no complaints. Right drain: 42/50/53\par \par Interval hx (4/30/21): Pt presents today POD #23 s/p panniculectomy. Drain output 40/60/40

## 2021-10-21 NOTE — DATA REVIEWED
[FreeTextEntry1] : Pathology             Final\par \par No Documents Attached\par \par \par \par   Kenan Accession Number : [id number]\par \par NYDIA SON\par \par \par \par Surgical Final Report\par \par \par \par \par Final Diagnosis\par Abdominal pannus (767 gms):\par - Skin with underlying adipose tissue showing fibrosis consistent\par with scar formation.\par - No active panniculitis is seen in the examined material.\par \par Verified by: Beto Dueñas MD\par (Electronic Signature)\par Reported on: 04/09/21 12:54 EDT, [address], [place],[facility] [place] [address]\par Phone: [phone]   Fax: [phone]\par _________________________________________________________________\par \par Clinical History\par Panniculectomy\par \par Specimen(s) Submitted\par Abdominal pannus\par \par Gross Description\par The specimen is received in formalin, labeled "abdominal pannus"\par and consists of two large pie shaped fragments of skin and\par subcutaneous tissue connected by thin rim of skin together\par measuring 24 x 21 x 3 cm and weighs 767 gm.  On the surface of\par the skin there is multiple focal areas of opening/defect that\par cannot be probed. The two areas of scar, one scar measures 2 cm\par in length and 0.3 cm in diameter and the other scar measures 3 cm\par in length and 0.3 cm in diameter.  On sectioning, cut surface is\par soft yellow lobulated with fibrotic bands in between and focal\par areas of hemorrhage. Representative sections are submitted.\par \par Summary of Sections:\par 1A-1C - random sections of skin with subcutaneous tissue -3\par 1D - section of scar -1\par 1E - section of subcutaneous tissue -1\par \par Total:  5 blocks\par \par Specimen was received and underwent gross examination at [facility], [address] [place] [address],\par [place] [address].\par \par 04/07/2021 15:32:33 EDT mk\par \par Perioperative Diagnosis\par Panniculitis\par \par  \par \par  Ordered by: HARINDER DE LA ROSA IV       Collected/Examined: 07Apr2021 09:06AM       \par Verified by: BUNNY HEBERT 14Apr2021 10:43AM       \par  Result Communication: Discussed results with patient;\par Stage: Final       \par  Performed at: [facility]       Resulted: 09Apr2021 12:54PM       Last Updated: 14Apr2021 10:43AM       Accession: [id number]

## 2021-10-21 NOTE — PHYSICAL EXAM
[de-identified] : well-developed female, NAD [de-identified] : unlabored breathing, good inspiratory effort  [de-identified] : abdominal and umbilical incisions healing well, nontender throughout, swelling as expected, no erythema, right drain in place and functional with dark SS output, no palpable fluid collection

## 2022-02-11 ENCOUNTER — APPOINTMENT (OUTPATIENT)
Dept: PLASTIC SURGERY | Facility: CLINIC | Age: 52
End: 2022-02-11
Payer: COMMERCIAL

## 2022-02-11 PROCEDURE — 14000 TIS TRNFR TRUNK 10 SQ CM/<: CPT

## 2022-02-18 NOTE — PROCEDURE
[FreeTextEntry6] : Patient is a 51 year old s/p panniculectomy here today for left side revision with local tissue rearrangement for wound closure.  \par \par The area was prepped and draped in the usual fashion.  Local anesthetic was administered using 1% lidocaine with epinephrine.\par \par The lesion was sharply excised and local tissue rearrangement was performed for wound closure.\par \par The wound measures approximately than 10 sq cm. \par \par Sutures Used: 3-0 monocryl\par \par \par Due to COVID 19, pre-visit patient instructions were explained to the patient and their symptoms were checked upon arrival.  \par Masks were used by the health care providers and staff and the examination room was cleaned after the patient visit was completed.\par

## 2022-02-24 ENCOUNTER — APPOINTMENT (OUTPATIENT)
Dept: PLASTIC SURGERY | Facility: CLINIC | Age: 52
End: 2022-02-24
Payer: COMMERCIAL

## 2022-02-24 DIAGNOSIS — E65 LOCALIZED ADIPOSITY: ICD-10-CM

## 2022-02-24 PROCEDURE — 99024 POSTOP FOLLOW-UP VISIT: CPT

## 2022-02-24 NOTE — ASSESSMENT
[FreeTextEntry1] : 52 yo F with h/o MWL s/p sleeve gastrectomy s/p panniculectomy April 2021 now POD#13 s/p revision of left sided scar. Doing well. \par \par -sutures removed \par -all adhesives removed\par -daily Aquaphor and Hydrocortisone 1%\par -may shower \par -Continue abdominal binder, no heavy lifting\par -Post-op instruction reviewed and all questions answered \par -F/u 2 weeks for scar management \par \par Due to COVID 19, pre-visit patient instructions were explained to the patient and their symptoms were checked upon arrival.  \par Masks were used by the health care providers and staff and the examination room was cleaned after the patient visit was completed.\par \par \par \par \par \par

## 2022-02-24 NOTE — HISTORY OF PRESENT ILLNESS
[FreeTextEntry1] : 51 yo F otherwise healthy with PMHx of morbid obesity, max weight of 240 lbs who is s/p sleeve gastrectomy in 2018 at [facility] currently 161 lbs stable for at least 1 year who presents for panniculectomy consultation. Patient is c/o hanging abdominal pannus, back pain, frequent rash in skin folds treated with OTC topicals. \par \par Occupation- paraprofessional \par Nonsmoker \par \par Interval hx (4/14/21): Pt presents today POD #7 s/p panniculectomy. C/o discomfort from left drain site but otherwise feeling well. Denies f/c, CP/SOB, calf pain, or significant incisional pain. Drain output : left 70/40/60, right 100/100/60.\par \par Interval hx (4/21/21): Pt presents today POD #14 s/p panniculectomy. Doing well c/o left abdominal discomfort around drain site. Denies any f/c or bleeding. Compliant with abdominal binder, ambulating. Drains functional Rt 60/50/40, Lt 20/10/15\par \par Interval hx (4/26/21): Pt presents today POD #19 s/p panniculectomy. Doing well, c/o pruritus but otherwise offers no complaints. Right drain: 42/50/53\par \par Interval hx (4/30/21): Pt presents today POD #23 s/p panniculectomy. Drain output 40/60/40\par \par Interval hx (10/21/21): Pt presents 6 months s/p panniculectomy. Doing well but concerned with left sided tissue fullness. \par \par Interval hx (2/24/21): Pt presents today POD#13 s/p revision of left abdominal scar. Doing well but c/o redness and pruritus around the incision. Denies any f/c or bleeding.

## 2022-02-24 NOTE — PHYSICAL EXAM
[de-identified] : well-developed female, NAD [de-identified] : unlabored breathing, good inspiratory effort  [de-identified] : abdominal and umbilical incisions healed, left sided incision healing well with minor superficial skin irritation secondary to adhesive, no palpable fluid collection, swelling and bruising as expected

## 2022-03-16 ENCOUNTER — APPOINTMENT (OUTPATIENT)
Dept: PLASTIC SURGERY | Facility: CLINIC | Age: 52
End: 2022-03-16
Payer: COMMERCIAL

## 2022-03-16 DIAGNOSIS — R19.00 INTRA-ABDOMINAL AND PELVIC SWELLING, MASS AND LUMP, UNSPECIFIED SITE: ICD-10-CM

## 2022-03-16 PROCEDURE — 99024 POSTOP FOLLOW-UP VISIT: CPT

## 2022-03-16 NOTE — PHYSICAL EXAM
[de-identified] : well-developed female, NAD [de-identified] : unlabored breathing, good inspiratory effort  [de-identified] : abdominal and umbilical incisions healed, swelling and bruising as expected, nontender supraumbilical bulge, palpable rectus diastasis

## 2022-03-16 NOTE — HISTORY OF PRESENT ILLNESS
[FreeTextEntry1] : 49 yo F otherwise healthy with PMHx of morbid obesity, max weight of 240 lbs who is s/p sleeve gastrectomy in 2018 at [facility] currently 161 lbs stable for at least 1 year who presents for panniculectomy consultation. Patient is c/o hanging abdominal pannus, back pain, frequent rash in skin folds treated with OTC topicals. \par \par Occupation- paraprofessional \par Nonsmoker \par \par Interval hx (4/14/21): Pt presents today POD #7 s/p panniculectomy. C/o discomfort from left drain site but otherwise feeling well. Denies f/c, CP/SOB, calf pain, or significant incisional pain. Drain output : left 70/40/60, right 100/100/60.\par \par Interval hx (4/21/21): Pt presents today POD #14 s/p panniculectomy. Doing well c/o left abdominal discomfort around drain site. Denies any f/c or bleeding. Compliant with abdominal binder, ambulating. Drains functional Rt 60/50/40, Lt 20/10/15\par \par Interval hx (4/26/21): Pt presents today POD #19 s/p panniculectomy. Doing well, c/o pruritus but otherwise offers no complaints. Right drain: 42/50/53\par \par Interval hx (4/30/21): Pt presents today POD #23 s/p panniculectomy. Drain output 40/60/40\par \par Interval hx (10/21/21): Pt presents 6 months s/p panniculectomy. Doing well but concerned with left sided tissue fullness. \par \par Interval hx (2/24/21): Pt presents today POD#13 s/p revision of left abdominal scar. Doing well but c/o redness and pruritus around the incision. Denies any f/c or bleeding. \par \par Interval hx (3/16/22): Pt presents today 5 weeks s/p revision of left abdominal scar. C/o small bulge above umbilicus. Denies pain.

## 2022-03-16 NOTE — ASSESSMENT
[FreeTextEntry1] : 52 yo F with h/o MWL s/p sleeve gastrectomy s/p panniculectomy April 2021 now 5 weeks s/p revision of left sided scar. Doing well. \par Supraumbilical bulge, r/o hernia\par \par -CT scan abdomen, pt may call for results\par -Scar care reviewed in detail\par -F/u 3 months with Dr. Acosta\par \par Due to COVID 19, pre-visit patient instructions were explained to the patient and their symptoms were checked upon arrival.  \par Masks were used by the health care providers and staff and the examination room was cleaned after the patient visit was completed.\par \par \par \par \par \par

## 2022-06-14 ENCOUNTER — APPOINTMENT (OUTPATIENT)
Dept: PLASTIC SURGERY | Facility: CLINIC | Age: 52
End: 2022-06-14

## 2022-12-05 NOTE — H&P PST ADULT - TEACHING/LEARNING EDUCATIONAL LEVEL
Pt called in with concern of cough, diarreah, earache, body aches, dizziness thinks its from earache, drainage in ear, no energy, had fever yesterday but broke last night.  Pt states symptoms started last week.  Pt tested negative with home covid test. Pt has been taking mucus releif, cold and flu AM/PM and vicks cough drops with no relief.  Pt was wanting same day appt, informed schedule was full. Pt is also scheduled to work at [employer] tomorrow and states she is not going to be able to work with how she feels now and will be needing a Dr statement, pt didn't know if a statement could be given without being seen.  Please advise   [facility]

## 2022-12-23 ENCOUNTER — APPOINTMENT (OUTPATIENT)
Dept: OBGYN | Facility: CLINIC | Age: 52
End: 2022-12-23

## 2023-04-12 ENCOUNTER — APPOINTMENT (OUTPATIENT)
Dept: ORTHOPEDIC SURGERY | Facility: CLINIC | Age: 53
End: 2023-04-12
Payer: COMMERCIAL

## 2023-04-12 ENCOUNTER — RESULT CHARGE (OUTPATIENT)
Age: 53
End: 2023-04-12

## 2023-04-12 VITALS — BODY MASS INDEX: 26.66 KG/M2 | WEIGHT: 160 LBS | HEIGHT: 65 IN

## 2023-04-12 DIAGNOSIS — G57.62 LESION OF PLANTAR NERVE, LEFT LOWER LIMB: ICD-10-CM

## 2023-04-12 DIAGNOSIS — G57.61 LESION OF PLANTAR NERVE, RIGHT LOWER LIMB: ICD-10-CM

## 2023-04-12 PROCEDURE — 73630 X-RAY EXAM OF FOOT: CPT | Mod: RT

## 2023-04-12 PROCEDURE — 99204 OFFICE O/P NEW MOD 45 MIN: CPT

## 2023-04-12 NOTE — DISCUSSION/SUMMARY
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  I discussed the patient's findings with her.  At this time I think the patient would benefit from a MRI without contrast in order to differentiate between a stress fracture and a second webspace neuroma.  This would help guide neck steps in treatment.  The patient is amenable to this plan.  All questions answered

## 2023-04-12 NOTE — DATA REVIEWED
[FreeTextEntry1] : Views of the patient's right foot ordered and reviewed by me personally.  X-rays demonstrate several accessory ossicles at the second and fifth metatarsal head.  There is evidence of a dorsal osteophyte present at the first tarsometatarsal joint.

## 2023-04-12 NOTE — IMAGING
[de-identified] : Alert oriented x3.  Pleasant cooperative exam.  I examined her right lower extremity.  On standing she has normal alignment of ankle-midfoot forefoot.  She is tender palpation at the second MTP joint as well as the second webspace.  There is swelling to the joint present.  There is a dorsal osteophyte present over the first tarsometatarsal joint with minimal associated pain.  She is neurovascular intact distally.

## 2023-04-12 NOTE — HISTORY OF PRESENT ILLNESS
[de-identified] : This very pleasant 53-year-old female patient presents with right foot pain which been present for the past month.  She denies any history of injury.  She localized the pain to the second MTP joint.  She was seen by an outside provider who placed her into a boot which did alleviate her pain.  However she still has a baseline level of pain once again centered over the second MTP joint.

## 2024-01-19 ENCOUNTER — APPOINTMENT (OUTPATIENT)
Dept: ORTHOPEDIC SURGERY | Facility: CLINIC | Age: 54
End: 2024-01-19
Payer: COMMERCIAL

## 2024-01-19 ENCOUNTER — RESULT CHARGE (OUTPATIENT)
Age: 54
End: 2024-01-19

## 2024-01-19 DIAGNOSIS — M25.511 PAIN IN RIGHT SHOULDER: ICD-10-CM

## 2024-01-19 PROCEDURE — 99203 OFFICE O/P NEW LOW 30 MIN: CPT

## 2024-01-19 PROCEDURE — 99213 OFFICE O/P EST LOW 20 MIN: CPT

## 2024-01-19 PROCEDURE — 73030 X-RAY EXAM OF SHOULDER: CPT | Mod: RT

## 2024-01-19 PROCEDURE — 73502 X-RAY EXAM HIP UNI 2-3 VIEWS: CPT

## 2024-01-19 RX ORDER — MELOXICAM 15 MG/1
15 TABLET ORAL
Qty: 30 | Refills: 0 | Status: ACTIVE | COMMUNITY
Start: 2024-01-19 | End: 1900-01-01

## 2024-01-19 NOTE — DISCUSSION/SUMMARY
[de-identified] : Impression: Right shoulder pain possibly due to impingement and right hip pain possibly due to a labral tear.  Plan: Patient was advised for physical therapy and MRI to the right hip.  Since patient has no improvement with physical therapy prescribed by her medical doctor I would like to get an MRI to rule out possible labral tear.  Follow-up: 6 weeks with Dr. Jeffery.

## 2024-01-19 NOTE — HISTORY OF PRESENT ILLNESS
[de-identified] : 53-year-old female here for evaluation pain to the right right shoulder. Patient states that she been having significant pain in the hip for several years, the pain is quite severe. Patient has some instability about her hip.  Patient states that she has some physical therapy in the past 2 months which was recommended by her medical doctor. Patient is having pain on the shoulder is quite significant especially with range of motion and overhead activities, significant pain when she sleeps at nighttime.

## 2024-01-19 NOTE — IMAGING
[de-identified] : Examination of the right shoulder, patient has painful range of motion to the Apley scratch.  Patient has tenderness over the anterior of the shoulder. Positive supraspinatus. Positive impingement signs.  Negative speeds. Negative drop arm test. Good motion to internal and external rotation with end pain. Negative apprehension.    X-ray of the right shoulder with 3 views was done in office today; negative for any acute fracture or dislocation.  Examination of the right hip, patient has some discomfort over the hip flexors to palpation. Patient has good range of motion to forward flexion, internal and external rotation with end of range pain. Patient has negative logrolling. Patient has pain with resisted hip flexion.  Negative straight leg raise. Neurovascular intact.  X-ray of the right hip was in office today, 2 views were taken, these x-rays negative for any acute fracture dislocation joint well-preserved.

## 2024-03-05 ENCOUNTER — NON-APPOINTMENT (OUTPATIENT)
Age: 54
End: 2024-03-05

## 2024-03-05 ENCOUNTER — APPOINTMENT (OUTPATIENT)
Dept: ORTHOPEDIC SURGERY | Facility: CLINIC | Age: 54
End: 2024-03-05
Payer: COMMERCIAL

## 2024-03-05 DIAGNOSIS — M25.551 PAIN IN RIGHT HIP: ICD-10-CM

## 2024-03-05 PROCEDURE — 99213 OFFICE O/P EST LOW 20 MIN: CPT

## 2024-03-05 PROCEDURE — 99203 OFFICE O/P NEW LOW 30 MIN: CPT

## 2024-03-05 NOTE — HISTORY OF PRESENT ILLNESS
[de-identified] : right hip pain pain getting into and out of a car  NAD Right hip:  no skin breakdown FF 0-110 ER 40 IR 20 positive impingement ttp hip ttp greater troch NVI comp soft and nt  Xray right hip: mild degen changes mri right hip: degen labral tear, chondromalacia  Plan: went over findings explained the imaging and mri cont cons tx pt PM for possible injection

## 2024-06-04 ENCOUNTER — APPOINTMENT (OUTPATIENT)
Dept: ORTHOPEDIC SURGERY | Facility: CLINIC | Age: 54
End: 2024-06-04

## 2024-12-31 ENCOUNTER — APPOINTMENT (OUTPATIENT)
Dept: ORTHOPEDIC SURGERY | Facility: CLINIC | Age: 54
End: 2024-12-31
Payer: COMMERCIAL

## 2024-12-31 DIAGNOSIS — M16.11 UNILATERAL PRIMARY OSTEOARTHRITIS, RIGHT HIP: ICD-10-CM

## 2024-12-31 PROCEDURE — 73502 X-RAY EXAM HIP UNI 2-3 VIEWS: CPT

## 2024-12-31 PROCEDURE — 99205 OFFICE O/P NEW HI 60 MIN: CPT
